# Patient Record
Sex: MALE | Race: WHITE | HISPANIC OR LATINO | Employment: UNEMPLOYED | URBAN - METROPOLITAN AREA
[De-identification: names, ages, dates, MRNs, and addresses within clinical notes are randomized per-mention and may not be internally consistent; named-entity substitution may affect disease eponyms.]

---

## 2024-03-09 ENCOUNTER — HOSPITAL ENCOUNTER (EMERGENCY)
Facility: HOSPITAL | Age: 49
Discharge: HOME | End: 2024-03-09
Attending: EMERGENCY MEDICINE
Payer: MEDICAID

## 2024-03-09 ENCOUNTER — APPOINTMENT (OUTPATIENT)
Dept: CT IMAGING | Facility: HOSPITAL | Age: 49
End: 2024-03-09
Payer: MEDICAID

## 2024-03-09 ENCOUNTER — HOSPITAL ENCOUNTER (EMERGENCY)
Facility: HOSPITAL | Age: 49
Discharge: HOME | End: 2024-03-09
Attending: STUDENT IN AN ORGANIZED HEALTH CARE EDUCATION/TRAINING PROGRAM
Payer: MEDICAID

## 2024-03-09 ENCOUNTER — APPOINTMENT (OUTPATIENT)
Dept: ULTRASOUND IMAGING | Facility: HOSPITAL | Age: 49
End: 2024-03-09
Payer: MEDICAID

## 2024-03-09 VITALS
TEMPERATURE: 100 F | HEART RATE: 98 BPM | RESPIRATION RATE: 18 BRPM | SYSTOLIC BLOOD PRESSURE: 111 MMHG | OXYGEN SATURATION: 93 % | DIASTOLIC BLOOD PRESSURE: 69 MMHG

## 2024-03-09 VITALS
RESPIRATION RATE: 16 BRPM | DIASTOLIC BLOOD PRESSURE: 87 MMHG | SYSTOLIC BLOOD PRESSURE: 141 MMHG | OXYGEN SATURATION: 98 % | HEART RATE: 101 BPM | TEMPERATURE: 98.5 F

## 2024-03-09 DIAGNOSIS — N43.3 HYDROCELE, UNSPECIFIED HYDROCELE TYPE: ICD-10-CM

## 2024-03-09 DIAGNOSIS — N45.1 EPIDIDYMITIS WITH NO ABSCESS: Primary | ICD-10-CM

## 2024-03-09 DIAGNOSIS — N45.1 EPIDIDYMITIS: Primary | ICD-10-CM

## 2024-03-09 LAB
ALBUMIN SERPL-MCNC: 4.3 G/DL (ref 3.5–5.2)
ALBUMIN/GLOB SERPL: 1.5 G/DL
ALP SERPL-CCNC: 74 U/L (ref 39–117)
ALT SERPL W P-5'-P-CCNC: 17 U/L (ref 1–41)
ANION GAP SERPL CALCULATED.3IONS-SCNC: 10.7 MMOL/L (ref 5–15)
ANISOCYTOSIS BLD QL: ABNORMAL
AST SERPL-CCNC: 27 U/L (ref 1–40)
BILIRUB SERPL-MCNC: 0.8 MG/DL (ref 0–1.2)
BILIRUB UR QL STRIP: NEGATIVE
BUN SERPL-MCNC: 11 MG/DL (ref 6–20)
BUN/CREAT SERPL: 14.7 (ref 7–25)
CALCIUM SPEC-SCNC: 8.7 MG/DL (ref 8.6–10.5)
CHLORIDE SERPL-SCNC: 101 MMOL/L (ref 98–107)
CLARITY UR: CLEAR
CO2 SERPL-SCNC: 25.3 MMOL/L (ref 22–29)
COLOR UR: YELLOW
CREAT SERPL-MCNC: 0.75 MG/DL (ref 0.76–1.27)
DEPRECATED RDW RBC AUTO: 45.3 FL (ref 37–54)
EGFRCR SERPLBLD CKD-EPI 2021: 110.6 ML/MIN/1.73
ERYTHROCYTE [DISTWIDTH] IN BLOOD BY AUTOMATED COUNT: 17.1 % (ref 12.3–15.4)
GLOBULIN UR ELPH-MCNC: 2.9 GM/DL
GLUCOSE SERPL-MCNC: 146 MG/DL (ref 65–99)
GLUCOSE UR STRIP-MCNC: NEGATIVE MG/DL
HCT VFR BLD AUTO: 37.2 % (ref 37.5–51)
HGB BLD-MCNC: 10.9 G/DL (ref 13–17.7)
HGB UR QL STRIP.AUTO: NEGATIVE
KETONES UR QL STRIP: NEGATIVE
LEUKOCYTE ESTERASE UR QL STRIP.AUTO: NEGATIVE
LIPASE SERPL-CCNC: 19 U/L (ref 13–60)
LYMPHOCYTES # BLD MANUAL: 0.15 10*3/MM3 (ref 0.7–3.1)
LYMPHOCYTES NFR BLD MANUAL: 2 % (ref 5–12)
MCH RBC QN AUTO: 21.9 PG (ref 26.6–33)
MCHC RBC AUTO-ENTMCNC: 29.3 G/DL (ref 31.5–35.7)
MCV RBC AUTO: 74.7 FL (ref 79–97)
MICROCYTES BLD QL: ABNORMAL
MONOCYTES # BLD: 0.31 10*3/MM3 (ref 0.1–0.9)
NEUTROPHILS # BLD AUTO: 15 10*3/MM3 (ref 1.7–7)
NEUTROPHILS NFR BLD MANUAL: 97 % (ref 42.7–76)
NITRITE UR QL STRIP: NEGATIVE
OVALOCYTES BLD QL SMEAR: ABNORMAL
PH UR STRIP.AUTO: 7.5 [PH] (ref 5–8)
PLAT MORPH BLD: NORMAL
PLATELET # BLD AUTO: 288 10*3/MM3 (ref 140–450)
PMV BLD AUTO: 9.1 FL (ref 6–12)
POIKILOCYTOSIS BLD QL SMEAR: ABNORMAL
POLYCHROMASIA BLD QL SMEAR: ABNORMAL
POTASSIUM SERPL-SCNC: 3.7 MMOL/L (ref 3.5–5.2)
PROT SERPL-MCNC: 7.2 G/DL (ref 6–8.5)
PROT UR QL STRIP: NEGATIVE
RBC # BLD AUTO: 4.98 10*6/MM3 (ref 4.14–5.8)
SODIUM SERPL-SCNC: 137 MMOL/L (ref 136–145)
SP GR UR STRIP: >=1.03 (ref 1–1.03)
UROBILINOGEN UR QL STRIP: NORMAL
VARIANT LYMPHS NFR BLD MANUAL: 1 % (ref 19.6–45.3)
WBC MORPH BLD: NORMAL
WBC NRBC COR # BLD AUTO: 15.46 10*3/MM3 (ref 3.4–10.8)

## 2024-03-09 PROCEDURE — 87591 N.GONORRHOEAE DNA AMP PROB: CPT | Performed by: EMERGENCY MEDICINE

## 2024-03-09 PROCEDURE — 87491 CHLMYD TRACH DNA AMP PROBE: CPT | Performed by: EMERGENCY MEDICINE

## 2024-03-09 PROCEDURE — 83690 ASSAY OF LIPASE: CPT | Performed by: EMERGENCY MEDICINE

## 2024-03-09 PROCEDURE — 85007 BL SMEAR W/DIFF WBC COUNT: CPT | Performed by: EMERGENCY MEDICINE

## 2024-03-09 PROCEDURE — 96374 THER/PROPH/DIAG INJ IV PUSH: CPT

## 2024-03-09 PROCEDURE — 93976 VASCULAR STUDY: CPT

## 2024-03-09 PROCEDURE — 25010000002 ONDANSETRON PER 1 MG: Performed by: EMERGENCY MEDICINE

## 2024-03-09 PROCEDURE — 25810000003 SODIUM CHLORIDE 0.9 % SOLUTION: Performed by: EMERGENCY MEDICINE

## 2024-03-09 PROCEDURE — 25010000002 HYDROMORPHONE 1 MG/ML SOLUTION: Performed by: EMERGENCY MEDICINE

## 2024-03-09 PROCEDURE — 82565 ASSAY OF CREATININE: CPT

## 2024-03-09 PROCEDURE — 25510000001 IOPAMIDOL 61 % SOLUTION: Performed by: EMERGENCY MEDICINE

## 2024-03-09 PROCEDURE — 81003 URINALYSIS AUTO W/O SCOPE: CPT | Performed by: EMERGENCY MEDICINE

## 2024-03-09 PROCEDURE — 76870 US EXAM SCROTUM: CPT

## 2024-03-09 PROCEDURE — 74177 CT ABD & PELVIS W/CONTRAST: CPT

## 2024-03-09 PROCEDURE — 85025 COMPLETE CBC W/AUTO DIFF WBC: CPT | Performed by: EMERGENCY MEDICINE

## 2024-03-09 PROCEDURE — 80053 COMPREHEN METABOLIC PANEL: CPT | Performed by: EMERGENCY MEDICINE

## 2024-03-09 PROCEDURE — 99285 EMERGENCY DEPT VISIT HI MDM: CPT

## 2024-03-09 PROCEDURE — 99282 EMERGENCY DEPT VISIT SF MDM: CPT

## 2024-03-09 PROCEDURE — 96375 TX/PRO/DX INJ NEW DRUG ADDON: CPT

## 2024-03-09 PROCEDURE — 25010000002 CEFTRIAXONE PER 250 MG: Performed by: EMERGENCY MEDICINE

## 2024-03-09 RX ORDER — HYDROCODONE BITARTRATE AND ACETAMINOPHEN 5; 325 MG/1; MG/1
1 TABLET ORAL EVERY 6 HOURS PRN
Qty: 12 TABLET | Refills: 0 | Status: SHIPPED | OUTPATIENT
Start: 2024-03-09 | End: 2024-03-12

## 2024-03-09 RX ORDER — ONDANSETRON 2 MG/ML
4 INJECTION INTRAMUSCULAR; INTRAVENOUS ONCE
Status: COMPLETED | OUTPATIENT
Start: 2024-03-09 | End: 2024-03-09

## 2024-03-09 RX ORDER — LEVOFLOXACIN 500 MG/1
500 TABLET, FILM COATED ORAL DAILY
Qty: 10 TABLET | Refills: 0 | Status: SHIPPED | OUTPATIENT
Start: 2024-03-09 | End: 2024-03-19

## 2024-03-09 RX ORDER — SODIUM CHLORIDE 0.9 % (FLUSH) 0.9 %
10 SYRINGE (ML) INJECTION AS NEEDED
Status: DISCONTINUED | OUTPATIENT
Start: 2024-03-09 | End: 2024-03-09 | Stop reason: HOSPADM

## 2024-03-09 RX ADMIN — SODIUM CHLORIDE 1000 ML: 9 INJECTION, SOLUTION INTRAVENOUS at 12:19

## 2024-03-09 RX ADMIN — CEFTRIAXONE SODIUM 1000 MG: 1 INJECTION, POWDER, FOR SOLUTION INTRAMUSCULAR; INTRAVENOUS at 14:28

## 2024-03-09 RX ADMIN — IOPAMIDOL 85 ML: 612 INJECTION, SOLUTION INTRAVENOUS at 12:13

## 2024-03-09 RX ADMIN — HYDROMORPHONE HYDROCHLORIDE 1 MG: 1 INJECTION, SOLUTION INTRAMUSCULAR; INTRAVENOUS; SUBCUTANEOUS at 12:05

## 2024-03-09 RX ADMIN — ONDANSETRON 4 MG: 2 INJECTION INTRAMUSCULAR; INTRAVENOUS at 12:05

## 2024-03-09 NOTE — ED NOTES
Pt to ed from job site via EMS    Pt c/o testicle pain/swelling x2 days. Pt denies pain with urination, no blood in urine, no injury.

## 2024-03-09 NOTE — ED PROVIDER NOTES
EMERGENCY DEPARTMENT ENCOUNTER    Room Number:  40/40  PCP: Provider, No Known    HPI:  Chief Complaint: Abdominal pain  A complete HPI/ROS/PMH/PSH/SH/FH are unobtainable due to: None  Context: Teodoro Jones is a 48 y.o. male who presents to the ED c/o acute generalized abdominal pain that began this morning.  It is severe and constant.  Worse with any kind of palpation.  No vomiting.  States he has had testicular swelling for the past 2 days.        PAST MEDICAL HISTORY  Active Ambulatory Problems     Diagnosis Date Noted    No Active Ambulatory Problems     Resolved Ambulatory Problems     Diagnosis Date Noted    No Resolved Ambulatory Problems     No Additional Past Medical History         PAST SURGICAL HISTORY  No past surgical history on file.      FAMILY HISTORY  No family history on file.      SOCIAL HISTORY  Social History     Socioeconomic History    Marital status:          ALLERGIES  Patient has no known allergies.        REVIEW OF SYSTEMS  Review of Systems     Included in HPI  All systems reviewed and negative except for those discussed in HPI.       PHYSICAL EXAM  ED Triage Vitals [03/09/24 1151]   Temp Heart Rate Resp BP SpO2   98.5 °F (36.9 °C) 103 20 150/90 99 %      Temp src Heart Rate Source Patient Position BP Location FiO2 (%)   Tympanic Monitor Lying Right arm --       Physical Exam      GENERAL: acute distress  HENT: nares patent  EYES: no scleral icterus  CV: regular rhythm, normal rate  RESPIRATORY: normal effort, clear to auscultation bilaterally  ABDOMEN: soft, generalized abdominal tenderness with guarding  : Bilateral testicular tenderness and swelling  MUSCULOSKELETAL: no deformity  NEURO: alert, moves all extremities, follows commands  PSYCH:  calm, cooperative  SKIN: warm, dry    Vital signs and nursing notes reviewed.          LAB RESULTS  Recent Results (from the past 24 hour(s))   Comprehensive Metabolic Panel    Collection Time: 03/09/24 12:02 PM    Specimen: Blood    Result Value Ref Range    Glucose 146 (H) 65 - 99 mg/dL    BUN 11 6 - 20 mg/dL    Creatinine 0.75 (L) 0.76 - 1.27 mg/dL    Sodium 137 136 - 145 mmol/L    Potassium 3.7 3.5 - 5.2 mmol/L    Chloride 101 98 - 107 mmol/L    CO2 25.3 22.0 - 29.0 mmol/L    Calcium 8.7 8.6 - 10.5 mg/dL    Total Protein 7.2 6.0 - 8.5 g/dL    Albumin 4.3 3.5 - 5.2 g/dL    ALT (SGPT) 17 1 - 41 U/L    AST (SGOT) 27 1 - 40 U/L    Alkaline Phosphatase 74 39 - 117 U/L    Total Bilirubin 0.8 0.0 - 1.2 mg/dL    Globulin 2.9 gm/dL    A/G Ratio 1.5 g/dL    BUN/Creatinine Ratio 14.7 7.0 - 25.0    Anion Gap 10.7 5.0 - 15.0 mmol/L    eGFR 110.6 >60.0 mL/min/1.73   Lipase    Collection Time: 03/09/24 12:02 PM    Specimen: Blood   Result Value Ref Range    Lipase 19 13 - 60 U/L   CBC Auto Differential    Collection Time: 03/09/24 12:02 PM    Specimen: Blood   Result Value Ref Range    WBC 15.46 (H) 3.40 - 10.80 10*3/mm3    RBC 4.98 4.14 - 5.80 10*6/mm3    Hemoglobin 10.9 (L) 13.0 - 17.7 g/dL    Hematocrit 37.2 (L) 37.5 - 51.0 %    MCV 74.7 (L) 79.0 - 97.0 fL    MCH 21.9 (L) 26.6 - 33.0 pg    MCHC 29.3 (L) 31.5 - 35.7 g/dL    RDW 17.1 (H) 12.3 - 15.4 %    RDW-SD 45.3 37.0 - 54.0 fl    MPV 9.1 6.0 - 12.0 fL    Platelets 288 140 - 450 10*3/mm3   Manual Differential    Collection Time: 03/09/24 12:02 PM    Specimen: Blood   Result Value Ref Range    Neutrophil % 97.0 (H) 42.7 - 76.0 %    Lymphocyte % 1.0 (L) 19.6 - 45.3 %    Monocyte % 2.0 (L) 5.0 - 12.0 %    Neutrophils Absolute 15.00 (H) 1.70 - 7.00 10*3/mm3    Lymphocytes Absolute 0.15 (L) 0.70 - 3.10 10*3/mm3    Monocytes Absolute 0.31 0.10 - 0.90 10*3/mm3    Anisocytosis Mod/2+ None Seen    Microcytes Mod/2+ None Seen    Ovalocytes Mod/2+ None Seen    Poikilocytes Mod/2+ None Seen    Polychromasia Mod/2+ None Seen    WBC Morphology Normal Normal    Platelet Morphology Normal Normal   Urinalysis With Microscopic If Indicated (No Culture) - Urine, Clean Catch    Collection Time: 03/09/24  2:08 PM     Specimen: Urine, Clean Catch   Result Value Ref Range    Color, UA Yellow Yellow, Straw    Appearance, UA Clear Clear    pH, UA 7.5 5.0 - 8.0    Specific Gravity, UA >=1.030 1.005 - 1.030    Glucose, UA Negative Negative    Ketones, UA Negative Negative    Bilirubin, UA Negative Negative    Blood, UA Negative Negative    Protein, UA Negative Negative    Leuk Esterase, UA Negative Negative    Nitrite, UA Negative Negative    Urobilinogen, UA 1.0 E.U./dL 0.2 - 1.0 E.U./dL       Ordered the above labs and reviewed the results.        RADIOLOGY  CT Abdomen Pelvis With Contrast    Result Date: 3/9/2024  CT SCAN OF THE ABDOMEN AND PELVIS WITH INTRAVENOUS CONTRAST  HISTORY: Generalized abdominal pain and testicular pain.  FINDINGS: The CT scan was performed as an emergency procedure through the abdomen and pelvis with intravenous contrast. The following findings are present: 1. The lung bases are clear. The liver, spleen, pancreas, and both adrenal glands are unremarkable. There is several tiny cortical cysts involving both kidneys. There is also a tiny nonobstructing stone in the right kidney. There is no evidence of renal obstruction and no CT evidence of pyelonephritis. The gallbladder has been removed. 2. There is no aortic aneurysm or retroperitoneal lymphadenopathy. The large and small bowel loops are normal in caliber and show no inflammatory change. The appendix appears normal. 3. In the pelvis, the prostate is slightly prominent in size with some central calcification and measures up to 4.9 cm. The urinary bladder has a smooth contour. The CT images extend through the upper scrotum and also include the glans penis. There is a moderately large right-sided hydrocele and small left hydrocele as best evaluated on today's testicular ultrasound. There are multiple dense calcifications within the glans penis measuring up to 15 mm and clinical correlation is recommended. These findings have been discussed with Dr. Marshall  Marques in the emergency room.      Radiation dose reduction techniques were utilized, including automated exposure control and exposure modulation based on body size.   This report was finalized on 3/9/2024 3:56 PM by Dr. Jamal Prabhakar M.D on Workstation: [id number]      US Scrotum & Testicles with doppler    Result Date: 3/9/2024  US SCROTUM AND TESTICLES WITH DOPPLER-  INDICATIONS: Scrotal pain and swelling  TECHNIQUE: SCROTAL ULTRASOUND WITH DOPPLER ASSESSMENT OF THE TESTICLES  COMPARISON: None available  FINDINGS:  The right testis measures 5.1 x 3.0 x 3.5 cm. Left testis measures 4.5 x 3.7 x 2.4 cm. Mild asymmetric greater vascularity in the right testis and right epididymis raises suspicion for represent epididymal orchitis. Duplex imaging shows otherwise normal vascularity, including real-time imaging, color flow Doppler imaging, and spectral analysis.  The right epididymal head measures 1.8 cm. Left epididymal head measures 1.4 cm.  Moderate to large right hydrocele is noted. Small left hydrocele is present. No varicocele is demonstrated.       No evidence for testicular torsion or testicular lesion. Mild right epididymal orchitis with moderate to large right hydrocele, small left hydrocele.    This report was finalized on 3/9/2024 1:33 PM by Dr. Dayday Barton M.D on Workstation: [email or web address]       Ordered the above noted radiological studies. Reviewed by me in PACS.        MEDICATIONS GIVEN IN ER  Medications   HYDROmorphone (DILAUDID) injection 1 mg (1 mg Intravenous Given 3/9/24 1205)   ondansetron (ZOFRAN) injection 4 mg (4 mg Intravenous Given 3/9/24 1205)   sodium chloride 0.9 % bolus 1,000 mL (0 mL Intravenous Stopped 3/9/24 1408)   iopamidol (ISOVUE-300) 61 % injection 100 mL (85 mL Intravenous Given by Other 3/9/24 1213)   cefTRIAXone (ROCEPHIN) 1,000 mg in sodium chloride 0.9 % 100 mL IVPB-VTB (0 mg Intravenous Stopped 3/9/24 1441)         ORDERS PLACED DURING THIS VISIT:  Orders Placed This  Encounter   Procedures    Chlamydia trachomatis, Neisseria gonorrhoeae, PCR - Urine, Urine, Clean Catch    CT Abdomen Pelvis With Contrast    US Scrotum & Testicles with doppler    Comprehensive Metabolic Panel    Lipase    Urinalysis With Microscopic If Indicated (No Culture) - Urine, Clean Catch    CBC Auto Differential    Manual Differential    POC Creatinine    CBC & Differential         OUTPATIENT MEDICATION MANAGEMENT:  No current Epic-ordered facility-administered medications on file.     Current Outpatient Medications Ordered in Epic   Medication Sig Dispense Refill    HYDROcodone-acetaminophen (NORCO) 5-325 MG per tablet Take 1 tablet by mouth Every 6 (Six) Hours As Needed for Moderate Pain or Severe Pain for up to 3 days. 12 tablet 0    levoFLOXacin (LEVAQUIN) 500 MG tablet Take 1 tablet by mouth Daily for 10 days. 10 tablet 0       PROCEDURES  Procedures          MEDICAL DECISION MAKING, PROGRESS, and CONSULTS    Discussion below represents my analysis of pertinent findings related to patient's condition, differential diagnosis, treatment plan and final disposition.            Differential diagnosis:    Differential diagnosis includes but not limited to:  - hepatobiliary pathology such as cholecystitis, cholangitis, and symptomatic cholelithiasis  - Pancreatitis  - Dyspepsia  - Small bowel obstruction  - Appendicitis  - Diverticulitis  - UTI including pyelonephritis  - Ureteral stone  - Zoster  - Colitis, including infectious and ischemic  - epididymitis  - testicular torsion                 Independent interpretation of labs, radiology studies, and discussions with consultants:  ED Course as of 03/09/24 2118   Sat Mar 09, 2024   1253 WBC(!): 15.46 [TD]      ED Course User Index  [TD] Rolando Mohan II, MD         I discussed CT findings with Dr. Prabhakar, radiology. Patient has what appear to be stones in his glans. However, on his exam, he has hard nodules that are mobile in the foreskin. He  states he's had this for years. It is not painful.     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 He is  and remains sexually active. Had chlamydia when he was younger.     I will treat him for both STD and possible E. Coli causing his epididymitis.         DIAGNOSIS  Final diagnoses:   Epididymitis with no abscess   Hydrocele, unspecified hydrocele type         DISPOSITION  DISCHARGE    FOLLOW-UP  [facility]  [address] [address]  [phone]  Schedule an appointment as soon as possible for a visit in 3 days           Medication List        New Prescriptions      HYDROcodone-acetaminophen 5-325 MG per tablet  Commonly known as: NORCO  Take 1 tablet by mouth Every 6 (Six) Hours As Needed for Moderate Pain or Severe Pain for up to 3 days.     levoFLOXacin 500 MG tablet  Commonly known as: LEVAQUIN  [address] tableta por va oral diariamente 10 whiting.  (Take 1 tablet by mouth Daily for 10 days.)               Where to Get Your Medications        These medications were sent to [facility] - [place] [address]      Hours: Monday to Friday 7 AM to 6 PM, Saturday & Sunday 8 AM to 4:30 PM (Closed 12 PM to 12:30 PM) Phone: [phone]   HYDROcodone-acetaminophen 5-325 MG per tablet  levoFLOXacin 500 MG tablet             Latest Documented Vital Signs:  As of 21:18 EST  BP- 141/87 HR- 101 Temp- 98.5 °F (36.9 °C) (Tympanic) O2 sat- 98%      --    Please note that portions of this were completed with a voice recognition program.       Note Disclaimer: At [facility], we believe that sharing information builds trust and better relationships. You are receiving this note because you are receiving care at [facility] or recently visited. It is possible you will see health information before a provider has talked with you about it. This kind of information can be easy to misunderstand. To help you fully  understand what it means for your health, we urge you to discuss this note with your provider.         Rolando Mohan II, MD  03/09/24 5225

## 2024-03-09 NOTE — ED PROVIDER NOTES
EMERGENCY DEPARTMENT ENCOUNTER  Room Number:  17/17  PCP: Provider, No Known  Independent Historians: Patient      HPI:  Chief Complaint: had concerns including Groin Pain.     Context: Teodoro Jones is a 48 y.o. male  who presents to the ED c/o acute groin pain.  Patient was seen in the emergency department earlier today and diagnosed with epididymitis.  Patient was prescribed pain medication and antibiotics.  Patient went to the pharmacy but did not have enough money to pay for them.  Patient tried to call someone to come pick him up and help him pay for them but could not get a hold of them.  Patient did drive to the ER but is waiting for someone to come get him so they can help pay for his medications.  Patient has no new complaints.  History gathered using .  Patient return to the ER because someone found him sleeping in the lobby.      Review of prior external notes (non-ED) -and- Review of prior external test results outside of this encounter: [facility] EPIC [facility] no prior visit notes and no prior diagnostic studies available for review.    Prescription drug monitoring program review:         PAST MEDICAL HISTORY  Active Ambulatory Problems     Diagnosis Date Noted    No Active Ambulatory Problems     Resolved Ambulatory Problems     Diagnosis Date Noted    No Resolved Ambulatory Problems     No Additional Past Medical History         PAST SURGICAL HISTORY  No past surgical history on file.      FAMILY HISTORY  No family history on file.      SOCIAL HISTORY  Social History     Socioeconomic History    Marital status:          ALLERGIES  Patient has no known allergies.      REVIEW OF SYSTEMS  Review of Systems  Included in HPI  All systems reviewed and negative except for those discussed in HPI.      PHYSICAL EXAM    I have reviewed the triage vital signs and nursing notes.    ED Triage Vitals [03/09/24 1619]   Temp Heart Rate Resp BP SpO2   100  °F (37.8 °C) 98 18 111/69 93 %      Temp src Heart Rate Source Patient Position BP Location FiO2 (%)   Tympanic Monitor Sitting Left arm --       Physical Exam  GENERAL: alert, no acute distress  SKIN: Warm, dry  HENT: Normocephalic, atraumatic  EYES: no scleral icterus  CV: regular rhythm, regular rate  RESPIRATORY: normal effort, lungs clear  ABDOMEN: soft, nontender, nondistended  MUSCULOSKELETAL: no deformity  NEURO: alert, moves all extremities, follows commands            LAB RESULTS  Recent Results (from the past 24 hour(s))   Comprehensive Metabolic Panel    Collection Time: 03/09/24 12:02 PM    Specimen: Blood   Result Value Ref Range    Glucose 146 (H) 65 - 99 mg/dL    BUN 11 6 - 20 mg/dL    Creatinine 0.75 (L) 0.76 - 1.27 mg/dL    Sodium 137 136 - 145 mmol/L    Potassium 3.7 3.5 - 5.2 mmol/L    Chloride 101 98 - 107 mmol/L    CO2 25.3 22.0 - 29.0 mmol/L    Calcium 8.7 8.6 - 10.5 mg/dL    Total Protein 7.2 6.0 - 8.5 g/dL    Albumin 4.3 3.5 - 5.2 g/dL    ALT (SGPT) 17 1 - 41 U/L    AST (SGOT) 27 1 - 40 U/L    Alkaline Phosphatase 74 39 - 117 U/L    Total Bilirubin 0.8 0.0 - 1.2 mg/dL    Globulin 2.9 gm/dL    A/G Ratio 1.5 g/dL    BUN/Creatinine Ratio 14.7 7.0 - 25.0    Anion Gap 10.7 5.0 - 15.0 mmol/L    eGFR 110.6 >60.0 mL/min/1.73   Lipase    Collection Time: 03/09/24 12:02 PM    Specimen: Blood   Result Value Ref Range    Lipase 19 13 - 60 U/L   CBC Auto Differential    Collection Time: 03/09/24 12:02 PM    Specimen: Blood   Result Value Ref Range    WBC 15.46 (H) 3.40 - 10.80 10*3/mm3    RBC 4.98 4.14 - 5.80 10*6/mm3    Hemoglobin 10.9 (L) 13.0 - 17.7 g/dL    Hematocrit 37.2 (L) 37.5 - 51.0 %    MCV 74.7 (L) 79.0 - 97.0 fL    MCH 21.9 (L) 26.6 - 33.0 pg    MCHC 29.3 (L) 31.5 - 35.7 g/dL    RDW 17.1 (H) 12.3 - 15.4 %    RDW-SD 45.3 37.0 - 54.0 fl    MPV 9.1 6.0 - 12.0 fL    Platelets 288 140 - 450 10*3/mm3   Manual Differential    Collection Time: 03/09/24 12:02 PM    Specimen: Blood   Result Value Ref  Range    Neutrophil % 97.0 (H) 42.7 - 76.0 %    Lymphocyte % 1.0 (L) 19.6 - 45.3 %    Monocyte % 2.0 (L) 5.0 - 12.0 %    Neutrophils Absolute 15.00 (H) 1.70 - 7.00 10*3/mm3    Lymphocytes Absolute 0.15 (L) 0.70 - 3.10 10*3/mm3    Monocytes Absolute 0.31 0.10 - 0.90 10*3/mm3    Anisocytosis Mod/2+ None Seen    Microcytes Mod/2+ None Seen    Ovalocytes Mod/2+ None Seen    Poikilocytes Mod/2+ None Seen    Polychromasia Mod/2+ None Seen    WBC Morphology Normal Normal    Platelet Morphology Normal Normal   Urinalysis With Microscopic If Indicated (No Culture) - Urine, Clean Catch    Collection Time: 03/09/24  2:08 PM    Specimen: Urine, Clean Catch   Result Value Ref Range    Color, UA Yellow Yellow, Straw    Appearance, UA Clear Clear    pH, UA 7.5 5.0 - 8.0    Specific Gravity, UA >=1.030 1.005 - 1.030    Glucose, UA Negative Negative    Ketones, UA Negative Negative    Bilirubin, UA Negative Negative    Blood, UA Negative Negative    Protein, UA Negative Negative    Leuk Esterase, UA Negative Negative    Nitrite, UA Negative Negative    Urobilinogen, UA 1.0 E.U./dL 0.2 - 1.0 E.U./dL         RADIOLOGY  CT Abdomen Pelvis With Contrast    Result Date: 3/9/2024  CT SCAN OF THE ABDOMEN AND PELVIS WITH INTRAVENOUS CONTRAST  HISTORY: Generalized abdominal pain and testicular pain.  FINDINGS: The CT scan was performed as an emergency procedure through the abdomen and pelvis with intravenous contrast. The following findings are present: 1. The lung bases are clear. The liver, spleen, pancreas, and both adrenal glands are unremarkable. There is several tiny cortical cysts involving both kidneys. There is also a tiny nonobstructing stone in the right kidney. There is no evidence of renal obstruction and no CT evidence of pyelonephritis. The gallbladder has been removed. 2. There is no aortic aneurysm or retroperitoneal lymphadenopathy. The large and small bowel loops are normal in caliber and show no inflammatory change. The  appendix appears normal. 3. In the pelvis, the prostate is slightly prominent in size with some central calcification and measures up to 4.9 cm. The urinary bladder has a smooth contour. The CT images extend through the upper scrotum and also include the glans penis. There is a moderately large right-sided hydrocele and small left hydrocele as best evaluated on today's testicular ultrasound. There are multiple dense calcifications within the glans penis measuring up to 15 mm and clinical correlation is recommended. These findings have been discussed with Dr. Rolando Mohan in the emergency room.      Radiation dose reduction techniques were utilized, including automated exposure control and exposure modulation based on body size.   This report was finalized on 3/9/2024 3:56 PM by Dr. Jamal Prabhakar M.D on Workstation: [email or web address]      US Scrotum & Testicles with doppler    Result Date: 3/9/2024  US SCROTUM AND TESTICLES WITH DOPPLER-  INDICATIONS: Scrotal pain and swelling  TECHNIQUE: SCROTAL ULTRASOUND WITH DOPPLER ASSESSMENT OF THE TESTICLES  COMPARISON: None available  FINDINGS:  The right testis measures 5.1 x 3.0 x 3.5 cm. Left testis measures 4.5 x 3.7 x 2.4 cm. Mild asymmetric greater vascularity in the right testis and right epididymis raises suspicion for represent epididymal orchitis. Duplex imaging shows otherwise normal vascularity, including real-time imaging, color flow Doppler imaging, and spectral analysis.  The right epididymal head measures 1.8 cm. Left epididymal head measures 1.4 cm.  Moderate to large right hydrocele is noted. Small left hydrocele is present. No varicocele is demonstrated.       No evidence for testicular torsion or testicular lesion. Mild right epididymal orchitis with moderate to large right hydrocele, small left hydrocele.    This report was finalized on 3/9/2024 1:33 PM by Dr. Dayday Barton M.D on Workstation: [email or web address]         MEDICATIONS GIVEN IN ER  Medications - No data  to display      ORDERS PLACED DURING THIS VISIT:  No orders of the defined types were placed in this encounter.        OUTPATIENT MEDICATION MANAGEMENT:  No current Epic-ordered facility-administered medications on file.     Current Outpatient Medications Ordered in Epic   Medication Sig Dispense Refill    HYDROcodone-acetaminophen (NORCO) 5-325 MG per tablet Take 1 tablet by mouth Every 6 (Six) Hours As Needed for Moderate Pain or Severe Pain for up to 3 days. 12 tablet 0    levoFLOXacin (LEVAQUIN) 500 MG tablet Take 1 tablet by mouth Daily for 10 days. 10 tablet 0       PROGRESS, DATA ANALYSIS, CONSULTS, AND MEDICAL DECISION MAKING  All labs have been independently interpreted by me.  All radiology studies have been reviewed by me. All EKG's have been independently viewed and interpreted by me.  Discussion below represents my analysis of pertinent findings related to patient's condition, differential diagnosis, treatment plan and final disposition.    Differential diagnosis includes but is not limited to epididymitis, testicular torsion, UTI.    Clinical Scores:                   ED Course as of 03/09/24 1837   Sat Mar 09, 2024   1837 Workup earlier in the emergency department reviewed and notable for epididymitis.  Patient was discharged with appropriate medications.  No case management present to help patient.  He states he has someone coming who can help him pay for his medications.  Patient with no new complaints at this time.  We will discharge patient to await his ride. [MW]      ED Course User Index  [MW] Andreas Umana MD             AS OF 18:37 EST VITALS:    BP - 111/69  HR - 98  TEMP - 100 °F (37.8 °C) (Tympanic)  O2 SATS - 93%    COMPLEXITY OF CARE  Admission was considered but after careful review of the patient's presentation, physical examination, diagnostic results, and response to treatment the patient may be safely discharged with outpatient follow-up.      DIAGNOSIS  Final diagnoses:    Epididymitis         DISPOSITION  ED Disposition       ED Disposition   Discharge    Condition   Stable    Comment   --                Please note that portions of this document were completed with a voice recognition program.    Note Disclaimer: At [facility], we believe that sharing information builds trust and better relationships. You are receiving this note because you recently visited [facility]. It is possible you will see health information before a provider has talked with you about it. This kind of information can be easy to misunderstand. To help you fully understand what it means for your health, we urge you to discuss this note with your provider.         Andreas Umana MD  03/09/24 2600

## 2024-03-09 NOTE — ED NOTES
Pt states he came back to the er because he was unable to get ahold of his ride home who was coming to get him and pay for his medication

## 2024-03-09 NOTE — DISCHARGE INSTRUCTIONS
Please take all medications as prescribed    Please follow-up with your primary care physician within 1 week for repeat evaluation, if you do not have a primary care physician you may call the number above for [facility] provider finder and they will help you establish care    Please return to the ER with new or worsening symptoms

## 2024-03-13 LAB
C TRACH RRNA SPEC QL NAA+PROBE: NEGATIVE
CREAT BLDA-MCNC: 0.7 MG/DL (ref 0.6–1.3)
N GONORRHOEA RRNA SPEC QL NAA+PROBE: NEGATIVE